# Patient Record
Sex: MALE | Race: WHITE | NOT HISPANIC OR LATINO | Employment: FULL TIME | URBAN - METROPOLITAN AREA
[De-identification: names, ages, dates, MRNs, and addresses within clinical notes are randomized per-mention and may not be internally consistent; named-entity substitution may affect disease eponyms.]

---

## 2022-08-28 ENCOUNTER — OFFICE VISIT (OUTPATIENT)
Dept: URGENT CARE | Facility: PHYSICIAN GROUP | Age: 27
End: 2022-08-28
Payer: COMMERCIAL

## 2022-08-28 ENCOUNTER — HOSPITAL ENCOUNTER (OUTPATIENT)
Dept: RADIOLOGY | Facility: MEDICAL CENTER | Age: 27
End: 2022-08-28
Attending: FAMILY MEDICINE
Payer: COMMERCIAL

## 2022-08-28 VITALS
SYSTOLIC BLOOD PRESSURE: 126 MMHG | RESPIRATION RATE: 16 BRPM | HEIGHT: 72 IN | WEIGHT: 230 LBS | BODY MASS INDEX: 31.15 KG/M2 | DIASTOLIC BLOOD PRESSURE: 72 MMHG | OXYGEN SATURATION: 97 % | HEART RATE: 74 BPM | TEMPERATURE: 98.2 F

## 2022-08-28 DIAGNOSIS — M79.89 LEG SWELLING: ICD-10-CM

## 2022-08-28 DIAGNOSIS — I82.812 SUPERFICIAL THROMBOSIS OF LEFT LOWER EXTREMITY: ICD-10-CM

## 2022-08-28 PROCEDURE — 93971 EXTREMITY STUDY: CPT | Mod: LT

## 2022-08-28 PROCEDURE — 99204 OFFICE O/P NEW MOD 45 MIN: CPT | Performed by: FAMILY MEDICINE

## 2022-08-28 RX ORDER — CEPHALEXIN 250 MG/1
250 CAPSULE ORAL 4 TIMES DAILY
COMMUNITY

## 2022-08-28 ASSESSMENT — ENCOUNTER SYMPTOMS
EYE REDNESS: 0
EYE DISCHARGE: 0
NAUSEA: 0
WEIGHT LOSS: 0
VOMITING: 0
MYALGIAS: 0

## 2022-08-28 NOTE — PROGRESS NOTES
Gladys Ball is a 26 y.o. male who presents with Leg Pain (L leg pain, swelling, (US, two blood clots were found 8/23/22))            2 weeks left leg swelling.  He has been working out of town in [place].  Ultrasound done there showed what sounds like superficial thrombophlebitis.  He was placed on NSAID and not anticoagulated.  He continues to have swelling of the leg with pronounced varicosities.  No chest pain.  No shortness of breath.  No hemoptysis. Differential diagnosis, natural history, supportive care, and indications for immediate follow-up discussed at length.         Review of Systems   Constitutional:  Negative for malaise/fatigue and weight loss.   Eyes:  Negative for discharge and redness.   Gastrointestinal:  Negative for nausea and vomiting.   Musculoskeletal:  Negative for joint pain and myalgias.   Skin:  Negative for itching and rash.            Objective     /72   Pulse 74   Temp 36.8 °C (98.2 °F) (Temporal)   Resp 16   Ht 1.829 m (6')   Wt 104 kg (230 lb)   SpO2 97%   BMI 31.19 kg/m²      Physical Exam  Constitutional:       General: He is not in acute distress.     Appearance: He is well-developed.   HENT:      Head: Normocephalic and atraumatic.   Eyes:      Conjunctiva/sclera: Conjunctivae normal.   Cardiovascular:      Rate and Rhythm: Normal rate and regular rhythm.      Heart sounds: Normal heart sounds. No murmur heard.     Comments: No JVD  Pulmonary:      Effort: Pulmonary effort is normal.      Breath sounds: Normal breath sounds. No wheezing.   Musculoskeletal:      Comments: Left calf is swollen.  No obvious cords.  Subtle varicosities proximal medial calf.  Mildly tender.   Skin:     General: Skin is warm and dry.      Findings: No rash.   Neurological:      Mental Status: He is alert.                           Assessment & Plan     Ultrasound per radiology:  1.  No evidence of Left  lower extremity deep venous thrombosis.     2.  There is superficial  venous thrombosis in portions of the distal left greater saphenous vein.    1. Leg swelling  US-EXTREMITY VENOUS LOWER UNILAT LEFT      2. Superficial thrombosis of left lower extremity          Differential diagnosis, natural history, supportive care, and indications for immediate follow-up discussed at length.     NSAID.  Elevation.  Warm or cool compress.  Recommend follow-up with primary care.